# Patient Record
Sex: MALE | Race: WHITE
[De-identification: names, ages, dates, MRNs, and addresses within clinical notes are randomized per-mention and may not be internally consistent; named-entity substitution may affect disease eponyms.]

---

## 2018-10-18 ENCOUNTER — HOSPITAL ENCOUNTER (EMERGENCY)
Dept: HOSPITAL 92 - ERS | Age: 23
Discharge: HOME | End: 2018-10-18
Payer: COMMERCIAL

## 2018-10-18 DIAGNOSIS — R51: Primary | ICD-10-CM

## 2018-10-18 LAB
COLOR CSF: COLORLESS
COLOR CSF: COLORLESS
GLUCOSE CSF-MCNC: 58 MG/DL (ref 40–70)
PROT CSF-MCNC: 51 MG/DL (ref 15–40)
RBC # CSF MANUAL: 0 /CUMM
RBC # CSF MANUAL: 17 /CUMM
SPECIMEN SOURCE FLD: (no result)
SPECIMEN SOURCE FLD: (no result)
WBC # CSF MANUAL: 1 /CUMM (ref 0–5)
WBC # CSF MANUAL: 1 /CUMM (ref 0–5)

## 2018-10-18 PROCEDURE — 89051 BODY FLUID CELL COUNT: CPT

## 2018-10-18 PROCEDURE — 96367 TX/PROPH/DG ADDL SEQ IV INF: CPT

## 2018-10-18 PROCEDURE — 96365 THER/PROPH/DIAG IV INF INIT: CPT

## 2018-10-18 PROCEDURE — 70450 CT HEAD/BRAIN W/O DYE: CPT

## 2018-10-18 PROCEDURE — 96375 TX/PRO/DX INJ NEW DRUG ADDON: CPT

## 2018-10-18 PROCEDURE — 87070 CULTURE OTHR SPECIMN AEROBIC: CPT

## 2018-10-18 PROCEDURE — 87205 SMEAR GRAM STAIN: CPT

## 2018-10-18 PROCEDURE — 82945 GLUCOSE OTHER FLUID: CPT

## 2018-10-18 PROCEDURE — 84157 ASSAY OF PROTEIN OTHER: CPT

## 2018-10-18 PROCEDURE — 62270 DX LMBR SPI PNXR: CPT
